# Patient Record
Sex: FEMALE | Race: WHITE
[De-identification: names, ages, dates, MRNs, and addresses within clinical notes are randomized per-mention and may not be internally consistent; named-entity substitution may affect disease eponyms.]

---

## 2020-05-06 ENCOUNTER — HOSPITAL ENCOUNTER (EMERGENCY)
Age: 60
Discharge: HOME | End: 2020-05-06
Payer: MEDICAID

## 2020-05-06 DIAGNOSIS — M54.42: Primary | ICD-10-CM

## 2020-05-07 ENCOUNTER — HOSPITAL ENCOUNTER (EMERGENCY)
Age: 60
Discharge: HOME | End: 2020-05-07
Payer: MEDICAID

## 2020-05-07 DIAGNOSIS — M54.5: Primary | ICD-10-CM

## 2020-05-07 DIAGNOSIS — M79.604: ICD-10-CM

## 2020-05-11 ENCOUNTER — HOSPITAL ENCOUNTER (EMERGENCY)
Age: 60
Discharge: HOME | End: 2020-05-11
Payer: MEDICAID

## 2020-05-11 ENCOUNTER — HOSPITAL ENCOUNTER (EMERGENCY)
Age: 60
LOS: 1 days | Discharge: HOME | End: 2020-05-12
Payer: MEDICAID

## 2020-05-11 DIAGNOSIS — M54.42: Primary | ICD-10-CM

## 2020-05-11 DIAGNOSIS — F41.9: ICD-10-CM

## 2020-05-11 DIAGNOSIS — M79.605: ICD-10-CM

## 2020-05-11 DIAGNOSIS — M54.5: Primary | ICD-10-CM

## 2020-07-13 ENCOUNTER — HOSPITAL ENCOUNTER (EMERGENCY)
Dept: HOSPITAL 82 - ED | Age: 60
Discharge: HOME | End: 2020-07-13
Payer: MEDICAID

## 2020-07-13 VITALS — SYSTOLIC BLOOD PRESSURE: 119 MMHG | DIASTOLIC BLOOD PRESSURE: 74 MMHG

## 2020-07-13 VITALS — HEIGHT: 66 IN | BODY MASS INDEX: 24.16 KG/M2 | WEIGHT: 150.31 LBS

## 2020-07-13 DIAGNOSIS — J44.9: ICD-10-CM

## 2020-07-13 DIAGNOSIS — M25.511: Primary | ICD-10-CM

## 2020-07-13 DIAGNOSIS — M54.2: ICD-10-CM

## 2020-07-13 DIAGNOSIS — G89.29: ICD-10-CM

## 2020-09-06 ENCOUNTER — HOSPITAL ENCOUNTER (INPATIENT)
Dept: HOSPITAL 82 - ED | Age: 60
LOS: 2 days | Discharge: HOME | DRG: 392 | End: 2020-09-08
Attending: INTERNAL MEDICINE | Admitting: INTERNAL MEDICINE
Payer: MEDICAID

## 2020-09-06 VITALS — DIASTOLIC BLOOD PRESSURE: 68 MMHG | SYSTOLIC BLOOD PRESSURE: 97 MMHG

## 2020-09-06 VITALS — WEIGHT: 147.27 LBS | HEIGHT: 66 IN | BODY MASS INDEX: 23.67 KG/M2

## 2020-09-06 DIAGNOSIS — K57.32: Primary | ICD-10-CM

## 2020-09-06 DIAGNOSIS — Z20.828: ICD-10-CM

## 2020-09-06 DIAGNOSIS — J44.9: ICD-10-CM

## 2020-09-06 LAB
ALBUMIN SERPL-MCNC: 4.3 G/DL (ref 3.2–5)
ALP SERPL-CCNC: 113 U/L (ref 38–126)
AMYLASE SERPL-CCNC: 41 U/L (ref 30–110)
ANION GAP SERPL CALCULATED.3IONS-SCNC: 13 MMOL/L
AST SERPL-CCNC: 15 U/L (ref 14–36)
BASOPHILS NFR BLD AUTO: 1 % (ref 0–3)
BILIRUB UR QL STRIP.AUTO: NEGATIVE
BUN SERPL-MCNC: 11 MG/DL (ref 7–17)
BUN/CREAT SERPL: 14
CHLORIDE SERPL-SCNC: 101 MMOL/L (ref 95–108)
CO2 SERPL-SCNC: 26 MMOL/L (ref 22–30)
COLOR UR AUTO: YELLOW
CREAT SERPL-MCNC: 0.8 MG/DL (ref 0.5–1)
EOSINOPHIL NFR BLD AUTO: 1 % (ref 0–8)
EPI CELLS URNS QL MICRO: (no result) EPI/HPF
ERYTHROCYTE [DISTWIDTH] IN BLOOD BY AUTOMATED COUNT: 13.3 % (ref 11.5–15.5)
GLUCOSE UR STRIP.AUTO-MCNC: NEGATIVE MG/DL
HCT VFR BLD AUTO: 37.7 % (ref 37–47)
HGB BLD-MCNC: 11.9 G/DL (ref 12–16)
HGB UR QL STRIP.AUTO: (no result)
IMM GRANULOCYTES NFR BLD: 0.4 % (ref 0–5)
KETONES UR STRIP.AUTO-MCNC: NEGATIVE MG/DL
LEUKOCYTE ESTERASE UR QL STRIP.AUTO: NEGATIVE
LIPASE SERPL-CCNC: 67 U/L (ref 23–300)
LYMPHOCYTES NFR BLD: 16 % (ref 15–41)
MCH RBC QN AUTO: 32.2 PG  CALC (ref 26–32)
MCHC RBC AUTO-ENTMCNC: 31.6 G/DL CAL (ref 32–36)
MCV RBC AUTO: 101.9 FL  CALC (ref 80–100)
MONOCYTES NFR BLD AUTO: 9 % (ref 2–13)
NEUTROPHILS # BLD AUTO: 14.2 THOU/UL (ref 2–7.15)
NEUTROPHILS NFR BLD AUTO: 73 % (ref 42–76)
NITRITE UR QL STRIP.AUTO: NEGATIVE
PH UR STRIP.AUTO: 6.5 [PH] (ref 4.5–8)
PLATELET # BLD AUTO: 357 THOU/UL (ref 130–400)
POTASSIUM SERPL-SCNC: 4.1 MMOL/L (ref 3.5–5.1)
PROT SERPL-MCNC: 7 G/DL (ref 6.3–8.2)
PROT UR QL STRIP.AUTO: NEGATIVE MG/DL
RBC # BLD AUTO: 3.7 MILL/UL (ref 4.2–5.6)
RBC #/AREA URNS HPF: (no result) RBC/HPF (ref 0–5)
SODIUM SERPL-SCNC: 136 MMOL/L (ref 137–146)
SP GR UR STRIP.AUTO: 1.01
UROBILINOGEN UR QL STRIP.AUTO: 0.2 E.U./DL

## 2020-09-06 NOTE — NUR
REPORT FROM DOMINGO LOAIZA. PT NOTED SITTING UP IN BED. NO APPARENT DISTRESS NOTED. PT
C/O ABD PAIN AND IS REQUESTING FOOD. ALERT AND ORIENTED. DISCUSSED POC. PT
VERBALIZED UNDERSTANDING. NO CURRENT PAIN MEDICATION ORDERS OR DIET ORDERED
WITH CONTACT ON CALL PHYSICIAN FOR ORDERS AT THIS TIME. IV SITE APPEARS
HEALTHY. CALL LIGHT WITHIN REACH. WILL CONTINUE TO MONITOR.

## 2020-09-06 NOTE — NUR
PT RESTING IN BED WITH EYES CLOSED, SNORING. NO APPARENT DISTRESS NOTED.
RESPIRATIONS EVEN AND UNLABORED. IV FLUIDS INFUSING WITHOUT DIFFICULTY. CALL
LIGHT WITHIN REACH. WILL CONTINUE TO MONITOR.

## 2020-09-06 NOTE — NUR
The patient is waiting to go to CT. She sts that the morphine helped her
headache but she still has cramps that are intermittent.

## 2020-09-06 NOTE — NUR
Admission Note
 
Report Given to:         
Transported by:          X Wheelchair           Stretcher
 
Transported with:        X Nurse     Transporter   X Patent IV    O2
                          Cardiac Monitor
 
Location:                 ICU      X [facility]
 
 
          
 
         PT TRANSPORTED IN STABLE CONDITION TO [facility] # [id number]. DOMINGO RN BEDSIDE
UPON ARRIVAL.

## 2020-09-07 VITALS — SYSTOLIC BLOOD PRESSURE: 110 MMHG | DIASTOLIC BLOOD PRESSURE: 64 MMHG

## 2020-09-07 VITALS — DIASTOLIC BLOOD PRESSURE: 54 MMHG | SYSTOLIC BLOOD PRESSURE: 103 MMHG

## 2020-09-07 VITALS — SYSTOLIC BLOOD PRESSURE: 91 MMHG | DIASTOLIC BLOOD PRESSURE: 60 MMHG

## 2020-09-07 LAB
ANION GAP SERPL CALCULATED.3IONS-SCNC: 9 MMOL/L
BASOPHILS NFR BLD AUTO: 1 % (ref 0–3)
BUN SERPL-MCNC: 8 MG/DL (ref 7–17)
BUN/CREAT SERPL: 12
CHLORIDE SERPL-SCNC: 104 MMOL/L (ref 95–108)
CO2 SERPL-SCNC: 26 MMOL/L (ref 22–30)
CREAT SERPL-MCNC: 0.7 MG/DL (ref 0.5–1)
EOSINOPHIL NFR BLD AUTO: 2 % (ref 0–8)
ERYTHROCYTE [DISTWIDTH] IN BLOOD BY AUTOMATED COUNT: 13.4 % (ref 11.5–15.5)
HCT VFR BLD AUTO: 33.7 % (ref 37–47)
HGB BLD-MCNC: 10.3 G/DL (ref 12–16)
IMM GRANULOCYTES NFR BLD: 0.6 % (ref 0–5)
LYMPHOCYTES NFR BLD: 19 % (ref 15–41)
MCH RBC QN AUTO: 31.8 PG  CALC (ref 26–32)
MCHC RBC AUTO-ENTMCNC: 30.6 G/DL CAL (ref 32–36)
MCV RBC AUTO: 104 FL  CALC (ref 80–100)
MONOCYTES NFR BLD AUTO: 8 % (ref 2–13)
NEUTROPHILS # BLD AUTO: 10.4 THOU/UL (ref 2–7.15)
NEUTROPHILS NFR BLD AUTO: 70 % (ref 42–76)
PLATELET # BLD AUTO: 309 THOU/UL (ref 130–400)
POTASSIUM SERPL-SCNC: 3.9 MMOL/L (ref 3.5–5.1)
RBC # BLD AUTO: 3.24 MILL/UL (ref 4.2–5.6)
SODIUM SERPL-SCNC: 134 MMOL/L (ref 137–146)

## 2020-09-07 NOTE — NUR
REPORT FROM RN. PT NOTED SITTING UP IN BED. NO APPARENT DISTRESS NOTED.
PT C/O ABD PAIN AND IS REQUESTING MEDICATION.  ALERT AND ORIENTED. DISCUSSED
POC. PT VERBALIZED UNDERSTANDING. WILL GIVE PRN MEDICATION.  IV SITE APPEARS
HEALTHY. CALL LIGHT WITHIN REACH. WILL CONTINUE TO MONITOR.

## 2020-09-07 NOTE — NUR
PT MEDICATED FOR ABD PAIN 7/10 WITH IV MORPHINE. NO APPARENT DISTRESS NOTED.
PT DENIES ANY OTHER WANTS OR NEEDS AT THIS TIME. CALL LIGHT WITHIN REACH. WILL
CONTINUE TO MONITOR.

## 2020-09-07 NOTE — NUR
Patient medicated twice with Morphine as scheduled. and stated it din't really
help. Writer in room while talking with resident and she fell asleep. Dr in
and new orders written to start Loratab 1 given at 12 noon. Recheck with
shima on pain level and she stated that the Loratab didn't help much.

## 2020-09-08 VITALS — DIASTOLIC BLOOD PRESSURE: 69 MMHG | SYSTOLIC BLOOD PRESSURE: 105 MMHG

## 2020-09-08 VITALS — DIASTOLIC BLOOD PRESSURE: 55 MMHG | SYSTOLIC BLOOD PRESSURE: 94 MMHG

## 2020-09-08 LAB
ALBUMIN SERPL-MCNC: 3.2 G/DL (ref 3.2–5)
ALP SERPL-CCNC: 80 U/L (ref 38–126)
ANION GAP SERPL CALCULATED.3IONS-SCNC: 8 MMOL/L
AST SERPL-CCNC: 13 U/L (ref 14–36)
BASOPHILS NFR BLD AUTO: 1 % (ref 0–3)
BUN SERPL-MCNC: 4 MG/DL (ref 7–17)
BUN/CREAT SERPL: 7
CHLORIDE SERPL-SCNC: 106 MMOL/L (ref 95–108)
CO2 SERPL-SCNC: 28 MMOL/L (ref 22–30)
CREAT SERPL-MCNC: 0.5 MG/DL (ref 0.5–1)
EOSINOPHIL NFR BLD AUTO: 3 % (ref 0–8)
ERYTHROCYTE [DISTWIDTH] IN BLOOD BY AUTOMATED COUNT: 13.2 % (ref 11.5–15.5)
HCT VFR BLD AUTO: 31.7 % (ref 37–47)
HGB BLD-MCNC: 9.7 G/DL (ref 12–16)
IMM GRANULOCYTES NFR BLD: 0.4 % (ref 0–5)
LYMPHOCYTES NFR BLD: 21 % (ref 15–41)
MCH RBC QN AUTO: 32.2 PG  CALC (ref 26–32)
MCHC RBC AUTO-ENTMCNC: 30.6 G/DL CAL (ref 32–36)
MCV RBC AUTO: 105.3 FL  CALC (ref 80–100)
MONOCYTES NFR BLD AUTO: 8 % (ref 2–13)
NEUTROPHILS # BLD AUTO: 7.22 THOU/UL (ref 2–7.15)
NEUTROPHILS NFR BLD AUTO: 67 % (ref 42–76)
PLATELET # BLD AUTO: 313 THOU/UL (ref 130–400)
POTASSIUM SERPL-SCNC: 4.2 MMOL/L (ref 3.5–5.1)
PROT SERPL-MCNC: 5.6 G/DL (ref 6.3–8.2)
RBC # BLD AUTO: 3.01 MILL/UL (ref 4.2–5.6)
SODIUM SERPL-SCNC: 138 MMOL/L (ref 137–146)

## 2020-09-08 NOTE — NUR
Discharge instructions given. Patient verbalizes understanding of same.
Discharged in stable condition via Ambulatory to Home with
spouse. All belongings sent with pt.

## 2020-09-08 NOTE — NUR
REPORT RECEIVED FROM JOSSE ANDRES. PT SITTING UP IN HIGH FOWLERS EATING CLEAR
LIQUID BREAKFAST; ALERT AND ORIENTED. DENIES PAIN WHILE RESTING, BUT REPORTS
PAIN IF SHE APPLIES PRESSURE TO LEFT UPPER QUADRANT OF ABDOMEN. REPORTS THAT
SHE IS TOLERATING HER CLEAR LIQUID DIET WELL WITH ONLY MILD PAIN AFTER EATING;
DENIES ANY NAUSEA. RESPIRATIONS EVEN AND UNLABORED ON ROOM AIR. PLAN OF CARE
REVIEWED. PT ENCOURAGED TO VERBALIZE CONCERNS. STATES UNDERSTANDING. SAFETY
MEASURES IN PLACE. CALL LIGHT WITHIN REACH.

## 2020-09-08 NOTE — NUR
PT IS RESTING IN BED WITH NO S/S OF DISTRESS NOTED.  IV PATENT WITH NO
REDDNESS OR SWELLING.  PT DENIES ANY NEEDS AT THIS TIME. CALL LIGHT IN REACH.

## 2020-09-08 NOTE — NUR
IV site discontinued, cath intact.  No edema , no redness, voices no
discomfort. Cipro completed prior to dc.

## 2020-10-04 ENCOUNTER — HOSPITAL ENCOUNTER (EMERGENCY)
Dept: HOSPITAL 82 - ED | Age: 60
Discharge: HOME | End: 2020-10-04
Payer: MEDICAID

## 2020-10-04 VITALS — BODY MASS INDEX: 24.8 KG/M2 | HEIGHT: 66 IN | WEIGHT: 154.32 LBS

## 2020-10-04 VITALS — DIASTOLIC BLOOD PRESSURE: 65 MMHG | SYSTOLIC BLOOD PRESSURE: 142 MMHG

## 2020-10-04 DIAGNOSIS — Z20.828: ICD-10-CM

## 2020-10-04 DIAGNOSIS — J44.1: Primary | ICD-10-CM

## 2020-10-04 DIAGNOSIS — F17.210: ICD-10-CM

## 2020-10-04 LAB
ALBUMIN SERPL-MCNC: 4.4 G/DL (ref 3.2–5)
ALP SERPL-CCNC: 83 U/L (ref 38–126)
ANION GAP SERPL CALCULATED.3IONS-SCNC: 11 MMOL/L
AST SERPL-CCNC: 29 U/L (ref 14–36)
BASOPHILS NFR BLD AUTO: 1 % (ref 0–3)
BUN SERPL-MCNC: 15 MG/DL (ref 7–17)
BUN/CREAT SERPL: 20
CHLORIDE SERPL-SCNC: 105 MMOL/L (ref 95–108)
CO2 SERPL-SCNC: 27 MMOL/L (ref 22–30)
CREAT SERPL-MCNC: 0.7 MG/DL (ref 0.5–1)
EOSINOPHIL NFR BLD AUTO: 4 % (ref 0–8)
ERYTHROCYTE [DISTWIDTH] IN BLOOD BY AUTOMATED COUNT: 13.9 % (ref 11.5–15.5)
HCT VFR BLD AUTO: 35.5 % (ref 37–47)
HGB BLD-MCNC: 11.5 G/DL (ref 12–16)
IMM GRANULOCYTES NFR BLD: 0.3 % (ref 0–5)
LYMPHOCYTES NFR BLD: 33 % (ref 15–41)
MCH RBC QN AUTO: 32.6 PG  CALC (ref 26–32)
MCHC RBC AUTO-ENTMCNC: 32.4 G/DL CAL (ref 32–36)
MCV RBC AUTO: 100.6 FL  CALC (ref 80–100)
MONOCYTES NFR BLD AUTO: 8 % (ref 2–13)
NEUTROPHILS # BLD AUTO: 5.22 THOU/UL (ref 2–7.15)
NEUTROPHILS NFR BLD AUTO: 54 % (ref 42–76)
PLATELET # BLD AUTO: 279 THOU/UL (ref 130–400)
POTASSIUM SERPL-SCNC: 4.9 MMOL/L (ref 3.5–5.1)
PROT SERPL-MCNC: 7.3 G/DL (ref 6.3–8.2)
RBC # BLD AUTO: 3.53 MILL/UL (ref 4.2–5.6)
SODIUM SERPL-SCNC: 138 MMOL/L (ref 137–146)

## 2021-06-05 ENCOUNTER — HOSPITAL ENCOUNTER (EMERGENCY)
Dept: HOSPITAL 82 - ED | Age: 61
Discharge: HOME | End: 2021-06-05
Payer: MEDICAID

## 2021-06-05 VITALS — DIASTOLIC BLOOD PRESSURE: 71 MMHG | SYSTOLIC BLOOD PRESSURE: 120 MMHG

## 2021-06-05 VITALS — WEIGHT: 146.17 LBS | BODY MASS INDEX: 23.49 KG/M2 | HEIGHT: 66 IN

## 2021-06-05 DIAGNOSIS — J44.9: ICD-10-CM

## 2021-06-05 DIAGNOSIS — M54.2: Primary | ICD-10-CM

## 2021-06-05 DIAGNOSIS — M54.5: ICD-10-CM

## 2021-06-05 DIAGNOSIS — G89.29: ICD-10-CM

## 2021-08-28 ENCOUNTER — HOSPITAL ENCOUNTER (EMERGENCY)
Dept: HOSPITAL 82 - ED | Age: 61
Discharge: HOME | End: 2021-08-28
Payer: MEDICAID

## 2021-08-28 VITALS — WEIGHT: 141.1 LBS | HEIGHT: 66 IN | BODY MASS INDEX: 22.68 KG/M2

## 2021-08-28 VITALS — DIASTOLIC BLOOD PRESSURE: 65 MMHG | SYSTOLIC BLOOD PRESSURE: 126 MMHG

## 2021-08-28 DIAGNOSIS — M25.531: Primary | ICD-10-CM

## 2021-08-28 DIAGNOSIS — Z20.822: ICD-10-CM

## 2021-08-28 DIAGNOSIS — J44.9: ICD-10-CM

## 2021-08-28 DIAGNOSIS — B34.9: ICD-10-CM

## 2022-08-10 ENCOUNTER — HOSPITAL ENCOUNTER (EMERGENCY)
Dept: HOSPITAL 82 - ED | Age: 62
Discharge: HOME | End: 2022-08-10
Payer: MEDICAID

## 2022-08-10 VITALS — WEIGHT: 146.17 LBS | BODY MASS INDEX: 23.49 KG/M2 | HEIGHT: 66 IN

## 2022-08-10 VITALS — DIASTOLIC BLOOD PRESSURE: 63 MMHG | SYSTOLIC BLOOD PRESSURE: 105 MMHG

## 2022-08-10 DIAGNOSIS — I10: ICD-10-CM

## 2022-08-10 DIAGNOSIS — L60.0: Primary | ICD-10-CM

## 2022-08-10 DIAGNOSIS — E11.9: ICD-10-CM

## 2022-08-10 DIAGNOSIS — F17.210: ICD-10-CM

## 2022-08-10 DIAGNOSIS — J44.9: ICD-10-CM

## 2022-09-06 ENCOUNTER — HOSPITAL ENCOUNTER (EMERGENCY)
Dept: HOSPITAL 82 - ED | Age: 62
Discharge: HOME | End: 2022-09-06
Payer: MEDICAID

## 2022-09-06 VITALS — DIASTOLIC BLOOD PRESSURE: 77 MMHG | SYSTOLIC BLOOD PRESSURE: 153 MMHG

## 2022-09-06 VITALS — DIASTOLIC BLOOD PRESSURE: 62 MMHG | SYSTOLIC BLOOD PRESSURE: 139 MMHG

## 2022-09-06 VITALS — DIASTOLIC BLOOD PRESSURE: 74 MMHG | SYSTOLIC BLOOD PRESSURE: 136 MMHG

## 2022-09-06 VITALS — DIASTOLIC BLOOD PRESSURE: 77 MMHG | SYSTOLIC BLOOD PRESSURE: 161 MMHG

## 2022-09-06 VITALS — SYSTOLIC BLOOD PRESSURE: 149 MMHG | DIASTOLIC BLOOD PRESSURE: 78 MMHG

## 2022-09-06 VITALS — BODY MASS INDEX: 24.09 KG/M2 | HEIGHT: 66 IN | WEIGHT: 149.91 LBS

## 2022-09-06 VITALS — SYSTOLIC BLOOD PRESSURE: 144 MMHG | DIASTOLIC BLOOD PRESSURE: 70 MMHG

## 2022-09-06 VITALS — DIASTOLIC BLOOD PRESSURE: 70 MMHG | SYSTOLIC BLOOD PRESSURE: 152 MMHG

## 2022-09-06 VITALS — DIASTOLIC BLOOD PRESSURE: 80 MMHG | SYSTOLIC BLOOD PRESSURE: 155 MMHG

## 2022-09-06 VITALS — SYSTOLIC BLOOD PRESSURE: 144 MMHG | DIASTOLIC BLOOD PRESSURE: 76 MMHG

## 2022-09-06 DIAGNOSIS — E11.9: ICD-10-CM

## 2022-09-06 DIAGNOSIS — R60.0: Primary | ICD-10-CM

## 2022-09-06 DIAGNOSIS — R53.1: ICD-10-CM

## 2022-09-06 DIAGNOSIS — Z20.822: ICD-10-CM

## 2022-09-06 DIAGNOSIS — I10: ICD-10-CM

## 2022-09-06 LAB
ALBUMIN SERPL-MCNC: 4.6 G/DL (ref 3.2–5)
ALP SERPL-CCNC: 94 U/L (ref 38–126)
ANION GAP SERPL CALCULATED.3IONS-SCNC: 13 MMOL/L
AST SERPL-CCNC: 20 U/L (ref 9–36)
BASOPHILS NFR BLD AUTO: 1 % (ref 0–3)
BILIRUB UR QL STRIP.AUTO: NEGATIVE
BUN SERPL-MCNC: 8 MG/DL (ref 8–23)
BUN/CREAT SERPL: 12
CHLORIDE SERPL-SCNC: 100 MMOL/L (ref 95–108)
CO2 SERPL-SCNC: 30 MMOL/L (ref 22–30)
COLOR UR AUTO: YELLOW
CREAT SERPL-MCNC: 0.7 MG/DL (ref 0.5–1)
EOSINOPHIL NFR BLD AUTO: 4 % (ref 0–8)
ERYTHROCYTE [DISTWIDTH] IN BLOOD BY AUTOMATED COUNT: 13.3 % (ref 11.5–15.5)
GLUCOSE UR STRIP.AUTO-MCNC: NEGATIVE MG/DL
HCT VFR BLD AUTO: 34.6 % (ref 37–47)
HGB BLD-MCNC: 11.4 G/DL (ref 12–16)
HGB UR QL STRIP.AUTO: NEGATIVE
IMM GRANULOCYTES NFR BLD: 0.1 % (ref 0–5)
INR PPP: 0.9 RATIO (ref 0.7–1.3)
KETONES UR STRIP.AUTO-MCNC: NEGATIVE MG/DL
LEUKOCYTE ESTERASE UR QL STRIP.AUTO: NEGATIVE
LYMPHOCYTES NFR BLD: 43 % (ref 15–41)
MCH RBC QN AUTO: 33.3 PG  CALC (ref 26–32)
MCHC RBC AUTO-ENTMCNC: 32.9 G/DL CAL (ref 32–36)
MCV RBC AUTO: 101.2 FL  CALC (ref 80–100)
MONOCYTES NFR BLD AUTO: 8 % (ref 2–13)
MYOGLOBIN SERPL-MCNC: 23 NG/ML (ref 0–62)
NEUTROPHILS # BLD AUTO: 4.37 THOU/UL (ref 2–7.15)
NEUTROPHILS NFR BLD AUTO: 44 % (ref 42–76)
NITRITE UR QL STRIP.AUTO: NEGATIVE
PH UR STRIP.AUTO: 8 [PH] (ref 4.5–8)
PLATELET # BLD AUTO: 422 THOU/UL (ref 130–400)
POTASSIUM SERPL-SCNC: 3.7 MMOL/L (ref 3.5–5.1)
PROT SERPL-MCNC: 7.7 G/DL (ref 6.3–8.2)
PROT UR QL STRIP.AUTO: NEGATIVE MG/DL
PROTHROMBIN TIME: 9.1 SECONDS (ref 9–12.5)
RBC # BLD AUTO: 3.42 MILL/UL (ref 4.2–5.6)
SODIUM SERPL-SCNC: 139 MMOL/L (ref 137–146)
SP GR UR STRIP.AUTO: 1.01
UROBILINOGEN UR QL STRIP.AUTO: 0.2 E.U./DL

## 2022-12-14 ENCOUNTER — HOSPITAL ENCOUNTER (EMERGENCY)
Dept: HOSPITAL 82 - ED | Age: 62
Discharge: HOME | End: 2022-12-14
Payer: MEDICAID

## 2022-12-14 VITALS — DIASTOLIC BLOOD PRESSURE: 75 MMHG | SYSTOLIC BLOOD PRESSURE: 121 MMHG

## 2022-12-14 VITALS — DIASTOLIC BLOOD PRESSURE: 74 MMHG | SYSTOLIC BLOOD PRESSURE: 127 MMHG

## 2022-12-14 VITALS — DIASTOLIC BLOOD PRESSURE: 70 MMHG | SYSTOLIC BLOOD PRESSURE: 127 MMHG

## 2022-12-14 VITALS — DIASTOLIC BLOOD PRESSURE: 73 MMHG | SYSTOLIC BLOOD PRESSURE: 121 MMHG

## 2022-12-14 VITALS — SYSTOLIC BLOOD PRESSURE: 138 MMHG | DIASTOLIC BLOOD PRESSURE: 77 MMHG

## 2022-12-14 VITALS — SYSTOLIC BLOOD PRESSURE: 123 MMHG | DIASTOLIC BLOOD PRESSURE: 69 MMHG

## 2022-12-14 VITALS — SYSTOLIC BLOOD PRESSURE: 115 MMHG | DIASTOLIC BLOOD PRESSURE: 72 MMHG

## 2022-12-14 VITALS — SYSTOLIC BLOOD PRESSURE: 129 MMHG | DIASTOLIC BLOOD PRESSURE: 76 MMHG

## 2022-12-14 VITALS — SYSTOLIC BLOOD PRESSURE: 140 MMHG | DIASTOLIC BLOOD PRESSURE: 77 MMHG

## 2022-12-14 VITALS — SYSTOLIC BLOOD PRESSURE: 107 MMHG | DIASTOLIC BLOOD PRESSURE: 69 MMHG

## 2022-12-14 VITALS — BODY MASS INDEX: 22.53 KG/M2 | HEIGHT: 66 IN | WEIGHT: 140.21 LBS

## 2022-12-14 VITALS — SYSTOLIC BLOOD PRESSURE: 121 MMHG | DIASTOLIC BLOOD PRESSURE: 70 MMHG

## 2022-12-14 VITALS — DIASTOLIC BLOOD PRESSURE: 70 MMHG | SYSTOLIC BLOOD PRESSURE: 118 MMHG

## 2022-12-14 VITALS — DIASTOLIC BLOOD PRESSURE: 78 MMHG | SYSTOLIC BLOOD PRESSURE: 131 MMHG

## 2022-12-14 VITALS — DIASTOLIC BLOOD PRESSURE: 63 MMHG | SYSTOLIC BLOOD PRESSURE: 119 MMHG

## 2022-12-14 DIAGNOSIS — J44.1: Primary | ICD-10-CM

## 2022-12-14 DIAGNOSIS — E11.9: ICD-10-CM

## 2022-12-14 DIAGNOSIS — M79.7: ICD-10-CM

## 2022-12-14 DIAGNOSIS — I10: ICD-10-CM

## 2022-12-14 DIAGNOSIS — E78.5: ICD-10-CM

## 2022-12-14 DIAGNOSIS — F17.200: ICD-10-CM

## 2022-12-14 DIAGNOSIS — Z20.822: ICD-10-CM

## 2022-12-14 LAB
ALBUMIN SERPL-MCNC: 4.3 G/DL (ref 3.2–5)
ALP SERPL-CCNC: 104 U/L (ref 38–126)
ANION GAP SERPL CALCULATED.3IONS-SCNC: 13 MMOL/L
AST SERPL-CCNC: 32 U/L (ref 9–36)
BASOPHILS NFR BLD AUTO: 1 % (ref 0–3)
BUN SERPL-MCNC: 11 MG/DL (ref 8–23)
BUN/CREAT SERPL: 15
CHLORIDE SERPL-SCNC: 94 MMOL/L (ref 95–108)
CO2 SERPL-SCNC: 30 MMOL/L (ref 22–30)
CREAT SERPL-MCNC: 0.7 MG/DL (ref 0.5–1)
D DIMER PPP FEU-MCNC: 0.3 MG/L (ref 0.19–0.6)
EOSINOPHIL NFR BLD AUTO: 3 % (ref 0–8)
ERYTHROCYTE [DISTWIDTH] IN BLOOD BY AUTOMATED COUNT: 13 % (ref 11.5–15.5)
HCT VFR BLD AUTO: 34.4 % (ref 37–47)
HGB BLD-MCNC: 11.2 G/DL (ref 12–16)
IMM GRANULOCYTES NFR BLD: 0.2 % (ref 0–5)
INR PPP: 1 RATIO (ref 0.7–1.3)
LYMPHOCYTES NFR BLD: 35 % (ref 15–41)
MCH RBC QN AUTO: 32.9 PG  CALC (ref 26–32)
MCHC RBC AUTO-ENTMCNC: 32.6 G/DL CAL (ref 32–36)
MCV RBC AUTO: 101.2 FL  CALC (ref 80–100)
MONOCYTES NFR BLD AUTO: 8 % (ref 2–13)
NEUTROPHILS # BLD AUTO: 2.75 THOU/UL (ref 2–7.15)
NEUTROPHILS NFR BLD AUTO: 53 % (ref 42–76)
PLATELET # BLD AUTO: 347 THOU/UL (ref 130–400)
POTASSIUM SERPL-SCNC: 3.9 MMOL/L (ref 3.5–5.1)
PROT SERPL-MCNC: 7.4 G/DL (ref 6.3–8.2)
PROTHROMBIN TIME: 9.7 SECONDS (ref 9–12.5)
RBC # BLD AUTO: 3.4 MILL/UL (ref 4.2–5.6)
SODIUM SERPL-SCNC: 133 MMOL/L (ref 137–146)

## 2022-12-16 ENCOUNTER — HOSPITAL ENCOUNTER (OUTPATIENT)
Dept: HOSPITAL 82 - ED | Age: 62
Setting detail: OBSERVATION
LOS: 1 days | Discharge: LEFT BEFORE DISCHARGE | End: 2022-12-17
Attending: INTERNAL MEDICINE | Admitting: INTERNAL MEDICINE
Payer: MEDICAID

## 2022-12-16 VITALS — SYSTOLIC BLOOD PRESSURE: 105 MMHG | DIASTOLIC BLOOD PRESSURE: 57 MMHG

## 2022-12-16 VITALS — DIASTOLIC BLOOD PRESSURE: 69 MMHG | SYSTOLIC BLOOD PRESSURE: 135 MMHG

## 2022-12-16 VITALS — SYSTOLIC BLOOD PRESSURE: 132 MMHG | DIASTOLIC BLOOD PRESSURE: 67 MMHG

## 2022-12-16 VITALS — SYSTOLIC BLOOD PRESSURE: 129 MMHG | DIASTOLIC BLOOD PRESSURE: 85 MMHG

## 2022-12-16 VITALS — HEIGHT: 66 IN | WEIGHT: 141.98 LBS | BODY MASS INDEX: 22.82 KG/M2

## 2022-12-16 VITALS — SYSTOLIC BLOOD PRESSURE: 116 MMHG | DIASTOLIC BLOOD PRESSURE: 63 MMHG

## 2022-12-16 VITALS — DIASTOLIC BLOOD PRESSURE: 60 MMHG | SYSTOLIC BLOOD PRESSURE: 111 MMHG

## 2022-12-16 VITALS — SYSTOLIC BLOOD PRESSURE: 107 MMHG | DIASTOLIC BLOOD PRESSURE: 48 MMHG

## 2022-12-16 VITALS — SYSTOLIC BLOOD PRESSURE: 123 MMHG | DIASTOLIC BLOOD PRESSURE: 55 MMHG

## 2022-12-16 VITALS — SYSTOLIC BLOOD PRESSURE: 117 MMHG | DIASTOLIC BLOOD PRESSURE: 66 MMHG

## 2022-12-16 VITALS — DIASTOLIC BLOOD PRESSURE: 67 MMHG | SYSTOLIC BLOOD PRESSURE: 122 MMHG

## 2022-12-16 VITALS — DIASTOLIC BLOOD PRESSURE: 59 MMHG | SYSTOLIC BLOOD PRESSURE: 118 MMHG

## 2022-12-16 VITALS — DIASTOLIC BLOOD PRESSURE: 55 MMHG | SYSTOLIC BLOOD PRESSURE: 109 MMHG

## 2022-12-16 DIAGNOSIS — J44.1: Primary | ICD-10-CM

## 2022-12-16 DIAGNOSIS — M19.90: ICD-10-CM

## 2022-12-16 DIAGNOSIS — F17.210: ICD-10-CM

## 2022-12-16 DIAGNOSIS — M79.7: ICD-10-CM

## 2022-12-16 DIAGNOSIS — E11.9: ICD-10-CM

## 2022-12-16 DIAGNOSIS — Z20.822: ICD-10-CM

## 2022-12-16 DIAGNOSIS — J96.01: ICD-10-CM

## 2022-12-16 DIAGNOSIS — I10: ICD-10-CM

## 2022-12-16 LAB
ALBUMIN SERPL-MCNC: 4.5 G/DL (ref 3.2–5)
ALP SERPL-CCNC: 105 U/L (ref 38–126)
ANION GAP SERPL CALCULATED.3IONS-SCNC: 12 MMOL/L
AST SERPL-CCNC: 25 U/L (ref 9–36)
BASOPHILS NFR BLD AUTO: 0 % (ref 0–3)
BUN SERPL-MCNC: 15 MG/DL (ref 8–23)
BUN/CREAT SERPL: 16
CHLORIDE SERPL-SCNC: 97 MMOL/L (ref 95–108)
CO2 SERPL-SCNC: 28 MMOL/L (ref 22–30)
CREAT SERPL-MCNC: 1 MG/DL (ref 0.5–1)
EOSINOPHIL NFR BLD AUTO: 0 % (ref 0–8)
ERYTHROCYTE [DISTWIDTH] IN BLOOD BY AUTOMATED COUNT: 13 % (ref 11.5–15.5)
HCT VFR BLD AUTO: 34 % (ref 37–47)
HGB BLD-MCNC: 11.3 G/DL (ref 12–16)
IMM GRANULOCYTES NFR BLD: 0.3 % (ref 0–5)
LYMPHOCYTES NFR BLD: 53 % (ref 15–41)
MCH RBC QN AUTO: 32.8 PG  CALC (ref 26–32)
MCHC RBC AUTO-ENTMCNC: 33.2 G/DL CAL (ref 32–36)
MCV RBC AUTO: 98.8 FL  CALC (ref 80–100)
MONOCYTES NFR BLD AUTO: 7 % (ref 2–13)
NEUTROPHILS # BLD AUTO: 2.75 THOU/UL (ref 2–7.15)
NEUTROPHILS NFR BLD AUTO: 40 % (ref 42–76)
PLATELET # BLD AUTO: 427 THOU/UL (ref 130–400)
POTASSIUM SERPL-SCNC: 4.5 MMOL/L (ref 3.5–5.1)
PROT SERPL-MCNC: 7.6 G/DL (ref 6.3–8.2)
RBC # BLD AUTO: 3.44 MILL/UL (ref 4.2–5.6)
SODIUM SERPL-SCNC: 133 MMOL/L (ref 137–146)

## 2022-12-16 PROCEDURE — G0378 HOSPITAL OBSERVATION PER HR: HCPCS

## 2022-12-16 NOTE — NUR
PATIENT BROUGHT TO TRIAGE ROOM AND PROVIDER NOTIFIED OF PATIENT STATUS.
RETURNED TO LOBBY TO AWAIT ROOM.

## 2022-12-16 NOTE — NUR
pt arrived via wc. A & Ox3. 2L NC tolorating well but does get winded when up
to bathrooom. lung sounds clear, ABD soft and non-tendered. skin intact. Med
rec has been reviewed and reconciled. Pt does not appear to be in no distress
at this time. oriented to room, pt education given to pt. Call lig in reach,
bed alarm on will continue to monitor.

## 2022-12-17 VITALS — SYSTOLIC BLOOD PRESSURE: 110 MMHG | DIASTOLIC BLOOD PRESSURE: 53 MMHG

## 2022-12-17 VITALS — SYSTOLIC BLOOD PRESSURE: 143 MMHG | DIASTOLIC BLOOD PRESSURE: 64 MMHG

## 2022-12-17 VITALS — SYSTOLIC BLOOD PRESSURE: 102 MMHG | DIASTOLIC BLOOD PRESSURE: 47 MMHG

## 2022-12-17 VITALS — SYSTOLIC BLOOD PRESSURE: 119 MMHG | DIASTOLIC BLOOD PRESSURE: 62 MMHG

## 2022-12-17 VITALS — DIASTOLIC BLOOD PRESSURE: 47 MMHG | SYSTOLIC BLOOD PRESSURE: 102 MMHG

## 2022-12-17 VITALS — DIASTOLIC BLOOD PRESSURE: 53 MMHG | SYSTOLIC BLOOD PRESSURE: 110 MMHG

## 2022-12-17 NOTE — NUR
RC'D REPORT FROM NIGHTSHIFT, PT IN BED COMPLAINS OF SOME ANXIETY, PT STATES
SHE WANTS TO GO HOME TODAY, PT ON 1 L NC AND WILL BE SLOWLY WEANED OFF, LUNGS
ARE CLEAR, UPDATED MED REC, PT AMBULATES AD GILEBRTO

## 2022-12-17 NOTE — NUR
Pt having a hard time falling asleep. currently receiving a breathing
treatment. Denies pain at this time. Call light in reach and bed alarm on.
Will continue to monitor.

## 2022-12-17 NOTE — NUR
PT DID 6 MIN WALK TEST AND FAILED. MD AWARE. OFFERED TO REPEAT WALK TEST, BUT
PT REFUSED AND WANTS TO LEAVE AMA

## 2022-12-20 ENCOUNTER — HOSPITAL ENCOUNTER (INPATIENT)
Dept: HOSPITAL 82 - ED | Age: 62
LOS: 3 days | Discharge: HOME | DRG: 190 | End: 2022-12-23
Attending: INTERNAL MEDICINE | Admitting: INTERNAL MEDICINE
Payer: MEDICAID

## 2022-12-20 VITALS — DIASTOLIC BLOOD PRESSURE: 71 MMHG | SYSTOLIC BLOOD PRESSURE: 142 MMHG

## 2022-12-20 VITALS — DIASTOLIC BLOOD PRESSURE: 87 MMHG | SYSTOLIC BLOOD PRESSURE: 164 MMHG

## 2022-12-20 VITALS — SYSTOLIC BLOOD PRESSURE: 136 MMHG | DIASTOLIC BLOOD PRESSURE: 72 MMHG

## 2022-12-20 VITALS — SYSTOLIC BLOOD PRESSURE: 139 MMHG | DIASTOLIC BLOOD PRESSURE: 80 MMHG

## 2022-12-20 VITALS — DIASTOLIC BLOOD PRESSURE: 94 MMHG | SYSTOLIC BLOOD PRESSURE: 154 MMHG

## 2022-12-20 VITALS — DIASTOLIC BLOOD PRESSURE: 65 MMHG | SYSTOLIC BLOOD PRESSURE: 152 MMHG

## 2022-12-20 VITALS — DIASTOLIC BLOOD PRESSURE: 91 MMHG | SYSTOLIC BLOOD PRESSURE: 159 MMHG

## 2022-12-20 VITALS — DIASTOLIC BLOOD PRESSURE: 71 MMHG | SYSTOLIC BLOOD PRESSURE: 141 MMHG

## 2022-12-20 VITALS — HEIGHT: 66 IN | WEIGHT: 149.91 LBS | BODY MASS INDEX: 24.09 KG/M2

## 2022-12-20 VITALS — SYSTOLIC BLOOD PRESSURE: 138 MMHG | DIASTOLIC BLOOD PRESSURE: 69 MMHG

## 2022-12-20 DIAGNOSIS — Z23: ICD-10-CM

## 2022-12-20 DIAGNOSIS — F41.9: ICD-10-CM

## 2022-12-20 DIAGNOSIS — Z20.822: ICD-10-CM

## 2022-12-20 DIAGNOSIS — J44.1: Primary | ICD-10-CM

## 2022-12-20 DIAGNOSIS — J96.01: ICD-10-CM

## 2022-12-20 DIAGNOSIS — G43.909: ICD-10-CM

## 2022-12-20 DIAGNOSIS — M79.7: ICD-10-CM

## 2022-12-20 DIAGNOSIS — E11.9: ICD-10-CM

## 2022-12-20 DIAGNOSIS — I10: ICD-10-CM

## 2022-12-20 DIAGNOSIS — F17.200: ICD-10-CM

## 2022-12-20 LAB
ALBUMIN SERPL-MCNC: 4.9 G/DL (ref 3.2–5)
ALP SERPL-CCNC: 120 U/L (ref 38–126)
ANION GAP SERPL CALCULATED.3IONS-SCNC: 18 MMOL/L
AST SERPL-CCNC: 31 U/L (ref 9–36)
BASOPHILS NFR BLD AUTO: 0.1 % (ref 0–3)
BILIRUB UR QL STRIP.AUTO: NEGATIVE
BUN SERPL-MCNC: 13 MG/DL (ref 8–23)
BUN/CREAT SERPL: 18
CHLORIDE SERPL-SCNC: 88 MMOL/L (ref 95–108)
CO2 SERPL-SCNC: 21 MMOL/L (ref 22–30)
COLOR UR AUTO: YELLOW
CREAT SERPL-MCNC: 0.7 MG/DL (ref 0.5–1)
EOSINOPHIL NFR BLD AUTO: 0.2 % (ref 0–8)
ERYTHROCYTE [DISTWIDTH] IN BLOOD BY AUTOMATED COUNT: 13.1 % (ref 11.5–15.5)
GLUCOSE UR STRIP.AUTO-MCNC: NEGATIVE MG/DL
HCT VFR BLD AUTO: 37.6 % (ref 37–47)
HGB BLD-MCNC: 13.1 G/DL (ref 12–16)
HGB UR QL STRIP.AUTO: NEGATIVE
IMM GRANULOCYTES NFR BLD: 2 % (ref 0–5)
KETONES UR STRIP.AUTO-MCNC: NEGATIVE MG/DL
LEUKOCYTE ESTERASE UR QL STRIP.AUTO: NEGATIVE
LYMPHOCYTES NFR BLD: 27 % (ref 15–41)
MCH RBC QN AUTO: 33.3 PG  CALC (ref 26–32)
MCHC RBC AUTO-ENTMCNC: 34.8 G/DL CAL (ref 32–36)
MCV RBC AUTO: 95.7 FL  CALC (ref 80–100)
MONOCYTES NFR BLD AUTO: 7.9 % (ref 2–13)
NEUTROPHILS # BLD AUTO: 7.35 THOU/UL (ref 2–7.15)
NEUTROPHILS NFR BLD AUTO: 62.8 % (ref 42–76)
NITRITE UR QL STRIP.AUTO: NEGATIVE
PH UR STRIP.AUTO: 6 [PH] (ref 4.5–8)
PLATELET # BLD AUTO: 596 THOU/UL (ref 130–400)
POTASSIUM SERPL-SCNC: 4.1 MMOL/L (ref 3.5–5.1)
PROT SERPL-MCNC: 8.2 G/DL (ref 6.3–8.2)
PROT UR QL STRIP.AUTO: NEGATIVE MG/DL
RBC # BLD AUTO: 3.93 MILL/UL (ref 4.2–5.6)
SODIUM SERPL-SCNC: 123 MMOL/L (ref 137–146)
SP GR UR STRIP.AUTO: 1.01
UROBILINOGEN UR QL STRIP.AUTO: 0.2 E.U./DL

## 2022-12-20 NOTE — NUR
PATIENT AGREES WITH PLAN FOR ADMISSION. WAITING ORDERS FOR VALIUM AS REQUESTED
BY PATIENT FOR ANXIETY.

## 2022-12-21 VITALS — DIASTOLIC BLOOD PRESSURE: 57 MMHG | SYSTOLIC BLOOD PRESSURE: 100 MMHG

## 2022-12-21 VITALS — DIASTOLIC BLOOD PRESSURE: 69 MMHG | SYSTOLIC BLOOD PRESSURE: 126 MMHG

## 2022-12-21 VITALS — DIASTOLIC BLOOD PRESSURE: 81 MMHG | SYSTOLIC BLOOD PRESSURE: 147 MMHG

## 2022-12-21 VITALS — DIASTOLIC BLOOD PRESSURE: 56 MMHG | SYSTOLIC BLOOD PRESSURE: 104 MMHG

## 2022-12-21 LAB
ALBUMIN SERPL-MCNC: 4.2 G/DL (ref 3.2–5)
ALP SERPL-CCNC: 98 U/L (ref 38–126)
ANION GAP SERPL CALCULATED.3IONS-SCNC: 14 MMOL/L
AST SERPL-CCNC: 21 U/L (ref 9–36)
BASOPHILS NFR BLD AUTO: 0 % (ref 0–3)
BUN SERPL-MCNC: 13 MG/DL (ref 8–23)
BUN/CREAT SERPL: 19
CHLORIDE SERPL-SCNC: 100 MMOL/L (ref 95–108)
CO2 SERPL-SCNC: 22 MMOL/L (ref 22–30)
CREAT SERPL-MCNC: 0.7 MG/DL (ref 0.5–1)
EOSINOPHIL NFR BLD AUTO: 0 % (ref 0–8)
ERYTHROCYTE [DISTWIDTH] IN BLOOD BY AUTOMATED COUNT: 13.2 % (ref 11.5–15.5)
HCT VFR BLD AUTO: 34.6 % (ref 37–47)
HGB BLD-MCNC: 12.4 G/DL (ref 12–16)
IMM GRANULOCYTES NFR BLD: 1.9 % (ref 0–5)
LYMPHOCYTES NFR BLD: 27.8 % (ref 15–41)
MCH RBC QN AUTO: 33.4 PG  CALC (ref 26–32)
MCHC RBC AUTO-ENTMCNC: 35.8 G/DL CAL (ref 32–36)
MCV RBC AUTO: 93.3 FL  CALC (ref 80–100)
MONOCYTES NFR BLD AUTO: 10 % (ref 2–13)
NEUTROPHILS # BLD AUTO: 4.42 THOU/UL (ref 2–7.15)
NEUTROPHILS NFR BLD AUTO: 60.3 % (ref 42–76)
PLATELET # BLD AUTO: 628 THOU/UL (ref 130–400)
POTASSIUM SERPL-SCNC: 4 MMOL/L (ref 3.5–5.1)
PROT SERPL-MCNC: 7.1 G/DL (ref 6.3–8.2)
RBC # BLD AUTO: 3.71 MILL/UL (ref 4.2–5.6)
SODIUM SERPL-SCNC: 132 MMOL/L (ref 137–146)

## 2022-12-21 NOTE — NUR
PATIENT SITTING UP IN BED WITH O2 VBIA NASAL CANNULA IN PLACE AT 2LPM. O2 SAT
IS 92% AT THBIS TIME. TELE MONITOR IN PLACE-[id number]. EYES ARE CLOSED. RESPS ARE
EVEN AND UNLABORED. CALL LIGHT IN REACH. WILL CONT TO MONITOR.

## 2022-12-21 NOTE — NUR
PATIENT ADMITTED FROM ER VIA WHEELCHAIR WITH NSG SUP IN ATTENDANCE. PATIENT
ABLE TO TRANSFER TO THE BED. O2 SAT UPON ADMISSION TO THE FLOOR WAS 80'S ON
ROOM AIR. O2 VIA NASAL CANNULA APPLIED AT 2LPM AND O2 SAT IMPROVED TO LOW TO
MID 90'S. AWAKE ALERT AND ORIENTEDX3., PATIENT WITH SOME ANXIETY AND ADMITS TO
HX OF HIGH ANXIETY AND COPD. CONT TO SMOKE 1/2 PACK OF CIGS/DAILY. MEDICATED
IN ER WITH VALIUM FOR ANXIETY. ALSO RECIEVED ANTIBIOTICS AND STEROIDS. LUNGS
ARE COARSE THROUOUT-PRODUCTIVE COUGH WITH CLEAR SECREATIONS. ABD IS SOFT WITH
ACTIVE BS. LAST BM WAS LAST SUNDAY 12/18. DENIES ANY DIFFICULTY WITH U
RINATION. NO PERIPHERAL EDEMA NOTED. FULSES ARE PALPABLE. INSTRUCTED ON USE OF
NURSE CALL LIGHT SYSTEM. PROVIDED WITH PO FLUIDS. SAFETY PRECAUTIONS
REINFORCED. CALL LIGHT IN REACH. WILL CONT TO MONITOR.

## 2022-12-21 NOTE — NUR
PT NOTED RESTING IN BED. NO APPARENT DISTRESS NOTED. RESPIRATIONS EVEN AND
UNLABORED. CONTINUES ON 2L/M VIA NC AND PULSE OX MONITOR. CALL LIGHT WITHIN
REACH. WILL CONTINUE TO MONITOR.

## 2022-12-21 NOTE — NUR
RECIEVED REPORT. PT A/OX3. RESPIRATIONS EVEN AND UNLABORED ON 2L NC. CRACKLES
AND WHEEZING NOTED UPON ASCULATATION OF LUNGS. HEART RHYTHM NORMAL WITH
TELE IN PLACE. BOWEL SOUNDS ACTIVE. #22G RH PATENT. SKIN INTACT. PT DENIES OF
ANY PAINS OR DISCOMFORTS AT THIS TIME. ALL SAFTEY PRECAUTIONS ARE IN PLACE
WITH CALL LIGHT IN REACH. PT ENCOURAGED TO CALL FOR ASSISTANCE WHEN NEEDED.

## 2022-12-21 NOTE — NUR
REPORT GIVEN TO JOSSE HARRIS. PATIENT TRANSPORTED TO FLOOR BY JOSSE ACUÑA WITH TELE
BOX 9 AND CONTINUOUS PULSE OX.

## 2022-12-21 NOTE — NUR
PATIENT SITTING UP IN BED AT THIS TIME WITH O2 VIA NASAL CANNULA INPLACE AT
2LPM. PATIENT ASKING FOR MORE "anxiety meds". DPATIENT WAS MEDICATED IN ER
WITH VALIUM 10MG PO AFTER MN. PATIENT WAS INSTRUCTED THAT THE MD WOULD BE IN
THIS MORNING TO EVAL HER AND WILL ORDER HER DAILY MEDS AT THAT TIME.
VERBALIZES UNDERSTANDING. SALINE LOCK TO LAC INTACT. TELE MONITOR IN
PLACE-LAST READING WAS  [id number]. CALL LIGHT IN REACH. WILL CONT TO MONITOR.

## 2022-12-22 VITALS — SYSTOLIC BLOOD PRESSURE: 113 MMHG | DIASTOLIC BLOOD PRESSURE: 49 MMHG

## 2022-12-22 VITALS — SYSTOLIC BLOOD PRESSURE: 100 MMHG | DIASTOLIC BLOOD PRESSURE: 59 MMHG

## 2022-12-22 VITALS — DIASTOLIC BLOOD PRESSURE: 57 MMHG | SYSTOLIC BLOOD PRESSURE: 129 MMHG

## 2022-12-22 VITALS — SYSTOLIC BLOOD PRESSURE: 96 MMHG | DIASTOLIC BLOOD PRESSURE: 42 MMHG

## 2022-12-22 VITALS — SYSTOLIC BLOOD PRESSURE: 127 MMHG | DIASTOLIC BLOOD PRESSURE: 60 MMHG

## 2022-12-22 VITALS — SYSTOLIC BLOOD PRESSURE: 114 MMHG | DIASTOLIC BLOOD PRESSURE: 67 MMHG

## 2022-12-22 LAB
ANION GAP SERPL CALCULATED.3IONS-SCNC: 14 MMOL/L
BUN SERPL-MCNC: 23 MG/DL (ref 8–23)
BUN/CREAT SERPL: 32
CHLORIDE SERPL-SCNC: 103 MMOL/L (ref 95–108)
CO2 SERPL-SCNC: 24 MMOL/L (ref 22–30)
CREAT SERPL-MCNC: 0.7 MG/DL (ref 0.5–1)
ERYTHROCYTE [DISTWIDTH] IN BLOOD BY AUTOMATED COUNT: 14 % (ref 11.5–15.5)
HCT VFR BLD AUTO: 34.1 % (ref 37–47)
HGB BLD-MCNC: 12 G/DL (ref 12–16)
MAGNESIUM SERPL-MCNC: 2.4 MG/DL (ref 1.6–2.3)
MCH RBC QN AUTO: 34.1 PG  CALC (ref 26–32)
MCHC RBC AUTO-ENTMCNC: 35.2 G/DL CAL (ref 32–36)
MCV RBC AUTO: 96.9 FL  CALC (ref 80–100)
PLATELET # BLD AUTO: 600 THOU/UL (ref 130–400)
POTASSIUM SERPL-SCNC: 3.9 MMOL/L (ref 3.5–5.1)
RBC # BLD AUTO: 3.52 MILL/UL (ref 4.2–5.6)
SODIUM SERPL-SCNC: 137 MMOL/L (ref 137–146)

## 2022-12-22 PROCEDURE — 3E0234Z INTRODUCTION OF SERUM, TOXOID AND VACCINE INTO MUSCLE, PERCUTANEOUS APPROACH: ICD-10-PCS | Performed by: INTERNAL MEDICINE

## 2022-12-22 NOTE — NUR
PT SLEEPING IN SEMI FOWLERS POSITION. RESPIRATIONS EVEN AND UNLABORED ON 2L
NC. TELE MONITORING IN PLACE. IV SITE NOTED. NO SIGNS OF ANY DISTRESS. ALL
SAFTEY PRECAUTIONS ARE IN PLACE WITH CALL LIGHT IN REACH

## 2022-12-22 NOTE — NUR
PATIENT SITTING UP IN BED. COMPLAINTS OF PAIN TO HEADACHE AND PAIN TO PREVIOUS
IV SITE ON RIGHT WRIST. SITE WAS WRAPPED, ELEVATED ON PILLOW AND ICE PACK
PROVIDED BY DAY SHIFT NURSE. ASSESSMENT COMPLETE. PRN MEDICATION GIVEN FOR
HEADACHE. OBSERVED SITE TO WRIST, SLIGHTLY PINK. SUPERVISOR TO COME TO ROOM TO
VIEW WRIST AS WELL. BED REMAINS IN LOW POSITION, CALL LIGHT IN REACH.

## 2022-12-22 NOTE — NUR
REPORT FROM CRISTY ANTON. PT NOTED SITTING UP IN BED. NO APPARENT DISTRESS
NOTED. PT REMAINS ON 2L/M VIA NC. TELEMETRY MONITOR IN PLACE. IV SITE APPEARS
HEALTHY. CALL LIGHT WITHIN REACH. WILL CONTINUE TO MONITOR.

## 2022-12-22 NOTE — NUR
PT SLEEPING IN SEMI FOWLERS POSITION. RESPIRATIONS EVEN AND UNLABORED ON 2L
NC . TELE MONITORING IN PLACE. #22G RH NOTED. NO S/S OF DISTRESS. ALL SAFTEY
PRECAUTIONS ARE IN PLACE WITH CALL LIGHT IN REACH

## 2022-12-22 NOTE — NUR
WRITER CALLED TO ROOM FOR DISCOMFORT TO IV SITE. IV SITE APPEARS INFILTRATED
SMALL POCKET OF FLUIDS UNDER SKIN NOTED. NO APPARENT REDNESS NOTED AT THIS
TIME. IV ABT INFUSION STOPPED AT THIS TIME AND IV SITE DISCONTINUED. COBAN
APPLIED, ICE PACK PROVIDED AND ARM ELEVATED ON PILLOW FOR COMFORT. NEW IV
STARTED X1 ATTEMPT, PT TOLERATED WELL. IV ABT RESTARTED AT THIS TIME. CALL
LIGHT WITHIN REACH. WILL CONTINUE TO MONITOR.

## 2022-12-22 NOTE — NUR
PATIENT RESTING IN BED. COMPLAINING OF HEADACHE. PRN PAIN MEDICATION GIVEN AS
INDICATED. REMINDED PATIENT TO TAKE SLOW DEEP BREATHS. NO MORE COMPLAINTS OF
LEFT LOWER ARM AREA HURTING. PICTURE WAS TAKEN FOR PATIENTS CHART TO MONITOR.
BED REMAINS IN LOW POSITION. CALL BELL IN REACH.

## 2022-12-22 NOTE — NUR
DISCUSSED WITH PT AT HOME OXYGEN AND EDUCATED ON MONITORING OXYGEN SATURATION.
PT REQUEST IF INSURANCE PAID FOR AT HOME PULSE OX. SPOKE WITH CASE MANAGEMENT
AT THIS TIME REGARDING REQUEST, PER CASE MANAGEMENT INSURANCE SHOULD PROVIDED
PULSE OX IF ORDERED WITH AT HOME OXYGEN. DISCUSSED WITH PT AND VISITOR. ALSO
DISCUSSED SAFETY PRECAUTIONS SUCH AS NOT SMOKING WHILE WEARING OXYGEN. PT
VERBALIZED UNDERSTANDING.

## 2022-12-22 NOTE — NUR
PT RESTING IN BED AT THIS TIME. NO APPARENT DISTRESS NOTED. O2 @ 2L/M VIA NC.
CALL LIGHT WITHIN REACH. WILL CONTINUE TO MONITOR.

## 2022-12-23 VITALS — DIASTOLIC BLOOD PRESSURE: 44 MMHG | SYSTOLIC BLOOD PRESSURE: 108 MMHG

## 2022-12-23 VITALS — DIASTOLIC BLOOD PRESSURE: 44 MMHG | SYSTOLIC BLOOD PRESSURE: 114 MMHG

## 2022-12-23 VITALS — DIASTOLIC BLOOD PRESSURE: 56 MMHG | SYSTOLIC BLOOD PRESSURE: 131 MMHG

## 2022-12-23 VITALS — SYSTOLIC BLOOD PRESSURE: 124 MMHG | DIASTOLIC BLOOD PRESSURE: 58 MMHG

## 2022-12-23 LAB
ANION GAP SERPL CALCULATED.3IONS-SCNC: 13 MMOL/L
BUN SERPL-MCNC: 19 MG/DL (ref 8–23)
BUN/CREAT SERPL: 27
CHLORIDE SERPL-SCNC: 102 MMOL/L (ref 95–108)
CO2 SERPL-SCNC: 26 MMOL/L (ref 22–30)
CREAT SERPL-MCNC: 0.7 MG/DL (ref 0.5–1)
ERYTHROCYTE [DISTWIDTH] IN BLOOD BY AUTOMATED COUNT: 14.3 % (ref 11.5–15.5)
HCT VFR BLD AUTO: 39.7 % (ref 37–47)
HGB BLD-MCNC: 13.3 G/DL (ref 12–16)
MAGNESIUM SERPL-MCNC: 2.5 MG/DL (ref 1.6–2.3)
MCH RBC QN AUTO: 33.3 PG  CALC (ref 26–32)
MCHC RBC AUTO-ENTMCNC: 33.5 G/DL CAL (ref 32–36)
MCV RBC AUTO: 99.5 FL  CALC (ref 80–100)
PLATELET # BLD AUTO: 671 THOU/UL (ref 130–400)
POTASSIUM SERPL-SCNC: 4.8 MMOL/L (ref 3.5–5.1)
RBC # BLD AUTO: 3.99 MILL/UL (ref 4.2–5.6)
SODIUM SERPL-SCNC: 136 MMOL/L (ref 137–146)

## 2022-12-23 NOTE — NUR
PT RESTING IN BED. STATES NO PAIN. BREATHING EVEN AND UNLABORED. NO DISTRESS
NOTED. FALL/SAFTEY PRECAUTION IN PLACE. CALL LIGHT WITHIN REACH

## 2022-12-23 NOTE — NUR
PATIENT ALERT AND ABLE TO MAKE NEEDS KNOWN. SITTING UP IN BED COMPLAINTS OF A
HEADACHE. PRN PAIN MEDICATION GIVEN PER REQUEST. PATIENT TOLERATED WELL. FRESH
GINGER ALE GIVEN. BED REMAINS IN LOW POSITION. CALL BELL IN REACH.

## 2022-12-23 NOTE — NUR
PT RESTING IN BED WATCHIGN TV. ASSESSMENT COMPLETE. TELE IN PLACE, COTNINOUS
MONTIORING PER ED.  TEELE BOX#9\
 
STATES NO PAIN. UPDATED T IN CURRENT PLAN OF CARE. PT TINDICATED
UNDERSTANDING. FALL/SAFTEY PRECAUTION IN PLACE. CALL LIGHT WITHIN REACH

## 2023-01-07 ENCOUNTER — HOSPITAL ENCOUNTER (EMERGENCY)
Dept: HOSPITAL 82 - ED | Age: 63
Discharge: HOME | End: 2023-01-07
Payer: MEDICAID

## 2023-01-07 VITALS — DIASTOLIC BLOOD PRESSURE: 73 MMHG | SYSTOLIC BLOOD PRESSURE: 128 MMHG

## 2023-01-07 VITALS — WEIGHT: 152.12 LBS | HEIGHT: 66 IN | BODY MASS INDEX: 24.45 KG/M2

## 2023-01-07 DIAGNOSIS — E11.9: ICD-10-CM

## 2023-01-07 DIAGNOSIS — J44.9: ICD-10-CM

## 2023-01-07 DIAGNOSIS — I10: ICD-10-CM
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
## 2023-01-07 DIAGNOSIS — F17.200: ICD-10-CM
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
## 2023-01-07 DIAGNOSIS — F41.1: ICD-10-CM
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
## 2023-01-07 DIAGNOSIS — S20.212A: Primary | ICD-10-CM

## 2023-03-10 ENCOUNTER — HOSPITAL ENCOUNTER (EMERGENCY)
Dept: HOSPITAL 82 - ED | Age: 63
Discharge: HOME | End: 2023-03-10
Payer: MEDICAID

## 2023-03-10 VITALS — SYSTOLIC BLOOD PRESSURE: 114 MMHG | DIASTOLIC BLOOD PRESSURE: 61 MMHG

## 2023-03-10 VITALS — WEIGHT: 144.84 LBS | HEIGHT: 66 IN | BODY MASS INDEX: 23.28 KG/M2

## 2023-03-10 VITALS — DIASTOLIC BLOOD PRESSURE: 65 MMHG | SYSTOLIC BLOOD PRESSURE: 114 MMHG

## 2023-03-10 VITALS — DIASTOLIC BLOOD PRESSURE: 68 MMHG | SYSTOLIC BLOOD PRESSURE: 103 MMHG

## 2023-03-10 VITALS — SYSTOLIC BLOOD PRESSURE: 111 MMHG | DIASTOLIC BLOOD PRESSURE: 56 MMHG

## 2023-03-10 VITALS — SYSTOLIC BLOOD PRESSURE: 119 MMHG | DIASTOLIC BLOOD PRESSURE: 64 MMHG

## 2023-03-10 VITALS — SYSTOLIC BLOOD PRESSURE: 112 MMHG | DIASTOLIC BLOOD PRESSURE: 66 MMHG

## 2023-03-10 VITALS — DIASTOLIC BLOOD PRESSURE: 52 MMHG | SYSTOLIC BLOOD PRESSURE: 105 MMHG

## 2023-03-10 VITALS — SYSTOLIC BLOOD PRESSURE: 113 MMHG | DIASTOLIC BLOOD PRESSURE: 57 MMHG

## 2023-03-10 DIAGNOSIS — J44.9: ICD-10-CM

## 2023-03-10 DIAGNOSIS — F41.1: ICD-10-CM

## 2023-03-10 DIAGNOSIS — I10: ICD-10-CM

## 2023-03-10 DIAGNOSIS — R19.7: Primary | ICD-10-CM

## 2023-03-10 DIAGNOSIS — E11.9: ICD-10-CM

## 2023-03-10 DIAGNOSIS — F17.200: ICD-10-CM

## 2023-03-11 ENCOUNTER — HOSPITAL ENCOUNTER (EMERGENCY)
Dept: HOSPITAL 82 - ED | Age: 63
Discharge: HOME | End: 2023-03-11
Payer: MEDICAID

## 2023-03-11 VITALS — HEIGHT: 66 IN | BODY MASS INDEX: 23.03 KG/M2 | WEIGHT: 143.3 LBS

## 2023-03-11 VITALS — DIASTOLIC BLOOD PRESSURE: 77 MMHG | SYSTOLIC BLOOD PRESSURE: 139 MMHG

## 2023-03-11 VITALS — SYSTOLIC BLOOD PRESSURE: 139 MMHG | DIASTOLIC BLOOD PRESSURE: 77 MMHG

## 2023-03-11 DIAGNOSIS — I10: ICD-10-CM

## 2023-03-11 DIAGNOSIS — M79.7: ICD-10-CM

## 2023-03-11 DIAGNOSIS — L08.9: ICD-10-CM

## 2023-03-11 DIAGNOSIS — J44.9: ICD-10-CM

## 2023-03-11 DIAGNOSIS — E11.9: ICD-10-CM

## 2023-03-11 DIAGNOSIS — F19.10: Primary | ICD-10-CM

## 2023-03-11 DIAGNOSIS — F41.1: ICD-10-CM

## 2023-03-11 LAB
ALBUMIN SERPL-MCNC: 4.8 G/DL (ref 3.2–5)
ALP SERPL-CCNC: 92 U/L (ref 38–126)
ANION GAP SERPL CALCULATED.3IONS-SCNC: 13 MMOL/L
AST SERPL-CCNC: 25 U/L (ref 9–36)
BASOPHILS NFR BLD AUTO: 1.1 % (ref 0–3)
BILIRUB UR QL STRIP.AUTO: NEGATIVE
BUN SERPL-MCNC: 11 MG/DL (ref 8–23)
BUN/CREAT SERPL: 13
CHLORIDE SERPL-SCNC: 100 MMOL/L (ref 95–108)
CO2 SERPL-SCNC: 22 MMOL/L (ref 22–30)
COLOR UR AUTO: YELLOW
CREAT SERPL-MCNC: 0.9 MG/DL (ref 0.5–1)
EOSINOPHIL NFR BLD AUTO: 3 % (ref 0–8)
ERYTHROCYTE [DISTWIDTH] IN BLOOD BY AUTOMATED COUNT: 13.4 % (ref 11.5–15.5)
GLUCOSE UR STRIP.AUTO-MCNC: NEGATIVE MG/DL
HCT VFR BLD AUTO: 38.9 % (ref 37–47)
HGB BLD-MCNC: 12.6 G/DL (ref 12–16)
HGB UR QL STRIP.AUTO: NEGATIVE
IMM GRANULOCYTES NFR BLD: 0.4 % (ref 0–5)
KETONES UR STRIP.AUTO-MCNC: NEGATIVE MG/DL
LEUKOCYTE ESTERASE UR QL STRIP.AUTO: NEGATIVE
LYMPHOCYTES NFR BLD: 45.8 % (ref 15–41)
MCH RBC QN AUTO: 31.8 PG  CALC (ref 26–32)
MCHC RBC AUTO-ENTMCNC: 32.4 G/DL CAL (ref 32–36)
MCV RBC AUTO: 98.2 FL  CALC (ref 80–100)
MONOCYTES NFR BLD AUTO: 9 % (ref 2–13)
NEUTROPHILS # BLD AUTO: 4.42 THOU/UL (ref 2–7.15)
NEUTROPHILS NFR BLD AUTO: 40.7 % (ref 42–76)
NITRITE UR QL STRIP.AUTO: NEGATIVE
PH UR STRIP.AUTO: 6 [PH] (ref 4.5–8)
PLATELET # BLD AUTO: 458 THOU/UL (ref 130–400)
POTASSIUM SERPL-SCNC: 4 MMOL/L (ref 3.5–5.1)
PROT SERPL-MCNC: 7.9 G/DL (ref 6.3–8.2)
PROT UR QL STRIP.AUTO: NEGATIVE MG/DL
RBC # BLD AUTO: 3.96 MILL/UL (ref 4.2–5.6)
SODIUM SERPL-SCNC: 131 MMOL/L (ref 137–146)
SP GR UR STRIP.AUTO: <=1.005
UROBILINOGEN UR QL STRIP.AUTO: 0.2 E.U./DL

## 2023-05-23 ENCOUNTER — HOSPITAL ENCOUNTER (OUTPATIENT)
Dept: HOSPITAL 82 - ENDO | Age: 63
Discharge: HOME | End: 2023-05-23
Attending: SURGERY
Payer: MEDICAID

## 2023-05-23 VITALS — DIASTOLIC BLOOD PRESSURE: 69 MMHG | SYSTOLIC BLOOD PRESSURE: 126 MMHG

## 2023-05-23 VITALS — BODY MASS INDEX: 22.5 KG/M2 | WEIGHT: 140 LBS | HEIGHT: 66 IN

## 2023-05-23 DIAGNOSIS — K29.70: ICD-10-CM

## 2023-05-23 DIAGNOSIS — D12.5: ICD-10-CM

## 2023-05-23 DIAGNOSIS — K64.8: ICD-10-CM

## 2023-05-23 DIAGNOSIS — I10: ICD-10-CM

## 2023-05-23 DIAGNOSIS — K57.30: ICD-10-CM

## 2023-05-23 DIAGNOSIS — Z12.11: Primary | ICD-10-CM

## 2023-05-23 DIAGNOSIS — K62.1: ICD-10-CM

## 2023-05-23 DIAGNOSIS — K64.4: ICD-10-CM

## 2023-09-10 ENCOUNTER — HOSPITAL ENCOUNTER (OUTPATIENT)
Dept: HOSPITAL 82 - ED | Age: 63
Setting detail: OBSERVATION
LOS: 1 days | Discharge: HOME | End: 2023-09-11
Attending: STUDENT IN AN ORGANIZED HEALTH CARE EDUCATION/TRAINING PROGRAM | Admitting: STUDENT IN AN ORGANIZED HEALTH CARE EDUCATION/TRAINING PROGRAM
Payer: MEDICAID

## 2023-09-10 VITALS — SYSTOLIC BLOOD PRESSURE: 153 MMHG | DIASTOLIC BLOOD PRESSURE: 86 MMHG

## 2023-09-10 VITALS — DIASTOLIC BLOOD PRESSURE: 72 MMHG | SYSTOLIC BLOOD PRESSURE: 141 MMHG

## 2023-09-10 VITALS — SYSTOLIC BLOOD PRESSURE: 155 MMHG | DIASTOLIC BLOOD PRESSURE: 83 MMHG

## 2023-09-10 VITALS — SYSTOLIC BLOOD PRESSURE: 151 MMHG | DIASTOLIC BLOOD PRESSURE: 84 MMHG

## 2023-09-10 VITALS — DIASTOLIC BLOOD PRESSURE: 75 MMHG | SYSTOLIC BLOOD PRESSURE: 135 MMHG

## 2023-09-10 VITALS — BODY MASS INDEX: 22.66 KG/M2 | HEIGHT: 66 IN | WEIGHT: 140.99 LBS

## 2023-09-10 VITALS — DIASTOLIC BLOOD PRESSURE: 84 MMHG | SYSTOLIC BLOOD PRESSURE: 156 MMHG

## 2023-09-10 VITALS — DIASTOLIC BLOOD PRESSURE: 78 MMHG | SYSTOLIC BLOOD PRESSURE: 159 MMHG

## 2023-09-10 VITALS — DIASTOLIC BLOOD PRESSURE: 90 MMHG | SYSTOLIC BLOOD PRESSURE: 156 MMHG

## 2023-09-10 VITALS — DIASTOLIC BLOOD PRESSURE: 75 MMHG | SYSTOLIC BLOOD PRESSURE: 161 MMHG

## 2023-09-10 VITALS — DIASTOLIC BLOOD PRESSURE: 77 MMHG | SYSTOLIC BLOOD PRESSURE: 153 MMHG

## 2023-09-10 VITALS — SYSTOLIC BLOOD PRESSURE: 165 MMHG | DIASTOLIC BLOOD PRESSURE: 88 MMHG

## 2023-09-10 VITALS — SYSTOLIC BLOOD PRESSURE: 142 MMHG | DIASTOLIC BLOOD PRESSURE: 94 MMHG

## 2023-09-10 VITALS — DIASTOLIC BLOOD PRESSURE: 63 MMHG | SYSTOLIC BLOOD PRESSURE: 148 MMHG

## 2023-09-10 VITALS — DIASTOLIC BLOOD PRESSURE: 81 MMHG | SYSTOLIC BLOOD PRESSURE: 156 MMHG

## 2023-09-10 VITALS — SYSTOLIC BLOOD PRESSURE: 141 MMHG | DIASTOLIC BLOOD PRESSURE: 85 MMHG

## 2023-09-10 DIAGNOSIS — I10: ICD-10-CM

## 2023-09-10 DIAGNOSIS — F90.9: ICD-10-CM

## 2023-09-10 DIAGNOSIS — E87.6: ICD-10-CM

## 2023-09-10 DIAGNOSIS — E87.1: Primary | ICD-10-CM

## 2023-09-10 DIAGNOSIS — F41.1: ICD-10-CM

## 2023-09-10 DIAGNOSIS — E11.9: ICD-10-CM

## 2023-09-10 DIAGNOSIS — J44.9: ICD-10-CM

## 2023-09-10 DIAGNOSIS — M79.7: ICD-10-CM

## 2023-09-10 DIAGNOSIS — Z73.3: ICD-10-CM

## 2023-09-10 LAB
ALBUMIN SERPL-MCNC: 5.3 G/DL (ref 3.2–5)
ALP SERPL-CCNC: 124 U/L (ref 38–126)
ANION GAP SERPL CALCULATED.3IONS-SCNC: 10 MMOL/L
ANION GAP SERPL CALCULATED.3IONS-SCNC: 18 MMOL/L
AST SERPL-CCNC: 35 U/L (ref 9–36)
BASOPHILS NFR BLD AUTO: 0.7 % (ref 0–3)
BUN SERPL-MCNC: 8 MG/DL (ref 8–23)
BUN SERPL-MCNC: 9 MG/DL (ref 8–23)
BUN/CREAT SERPL: 11
BUN/CREAT SERPL: 15
CHLORIDE SERPL-SCNC: 82 MMOL/L (ref 95–108)
CHLORIDE SERPL-SCNC: 97 MMOL/L (ref 95–108)
CO2 SERPL-SCNC: 23 MMOL/L (ref 22–30)
CO2 SERPL-SCNC: 24 MMOL/L (ref 22–30)
CREAT SERPL-MCNC: 0.6 MG/DL (ref 0.5–1)
CREAT SERPL-MCNC: 0.7 MG/DL (ref 0.5–1)
EOSINOPHIL NFR BLD AUTO: 0.3 % (ref 0–8)
ERYTHROCYTE [DISTWIDTH] IN BLOOD BY AUTOMATED COUNT: 13.3 % (ref 11.5–15.5)
HCT VFR BLD AUTO: 37.6 % (ref 37–47)
HGB BLD-MCNC: 13.3 G/DL (ref 12–16)
IMM GRANULOCYTES NFR BLD: 0.4 % (ref 0–5)
LYMPHOCYTES NFR BLD: 27.4 % (ref 15–41)
MCH RBC QN AUTO: 32.6 PG  CALC (ref 26–32)
MCHC RBC AUTO-ENTMCNC: 35.4 G/DL CAL (ref 32–36)
MCV RBC AUTO: 92.2 FL  CALC (ref 80–100)
MONOCYTES NFR BLD AUTO: 9.3 % (ref 2–13)
NEUTROPHILS # BLD AUTO: 6.63 THOU/UL (ref 2–7.15)
NEUTROPHILS NFR BLD AUTO: 61.9 % (ref 42–76)
PLATELET # BLD AUTO: 567 THOU/UL (ref 130–400)
POTASSIUM SERPL-SCNC: 3.1 MMOL/L (ref 3.5–5.1)
POTASSIUM SERPL-SCNC: 3.3 MMOL/L (ref 3.5–5.1)
PROT SERPL-MCNC: 8.6 G/DL (ref 6.3–8.2)
RBC # BLD AUTO: 4.08 MILL/UL (ref 4.2–5.6)
SODIUM SERPL-SCNC: 120 MMOL/L (ref 137–146)
SODIUM SERPL-SCNC: 128 MMOL/L (ref 137–146)

## 2023-09-10 PROCEDURE — G0378 HOSPITAL OBSERVATION PER HR: HCPCS

## 2023-09-11 VITALS — SYSTOLIC BLOOD PRESSURE: 139 MMHG | DIASTOLIC BLOOD PRESSURE: 69 MMHG

## 2023-09-11 VITALS — DIASTOLIC BLOOD PRESSURE: 72 MMHG | SYSTOLIC BLOOD PRESSURE: 156 MMHG

## 2023-09-11 VITALS — SYSTOLIC BLOOD PRESSURE: 140 MMHG | DIASTOLIC BLOOD PRESSURE: 68 MMHG

## 2023-09-11 LAB
ANION GAP SERPL CALCULATED.3IONS-SCNC: 12 MMOL/L
BUN SERPL-MCNC: 6 MG/DL (ref 8–23)
BUN/CREAT SERPL: 9
CHLORIDE SERPL-SCNC: 101 MMOL/L (ref 95–108)
CHOLEST SERPL-MCNC: 236 MG/DL (ref 0–199)
CHOLEST/HDLC SERPL: 4.7 {RATIO}
CO2 SERPL-SCNC: 25 MMOL/L (ref 22–30)
CREAT SERPL-MCNC: 0.7 MG/DL (ref 0.5–1)
ERYTHROCYTE [DISTWIDTH] IN BLOOD BY AUTOMATED COUNT: 13.6 % (ref 11.5–15.5)
HCT VFR BLD AUTO: 35.2 % (ref 37–47)
HDLC SERPL-MCNC: 50 MG/DL (ref 39–59)
HGB BLD-MCNC: 12.5 G/DL (ref 12–16)
KETONES UR STRIP.AUTO-MCNC: 15 MG/DL
LDLC SERPL CALC-MCNC: 151 MG/DL
MAGNESIUM SERPL-MCNC: 2.2 MG/DL (ref 1.6–2.3)
MCH RBC QN AUTO: 33.2 PG  CALC (ref 26–32)
MCHC RBC AUTO-ENTMCNC: 35.5 G/DL CAL (ref 32–36)
MCV RBC AUTO: 93.6 FL  CALC (ref 80–100)
PH UR STRIP.AUTO: 6 [PH] (ref 4.5–8)
PLATELET # BLD AUTO: 528 THOU/UL (ref 130–400)
POTASSIUM SERPL-SCNC: 3.5 MMOL/L (ref 3.5–5.1)
RBC # BLD AUTO: 3.76 MILL/UL (ref 4.2–5.6)
SODIUM SERPL-SCNC: 134 MMOL/L (ref 137–146)
SP GR UR STRIP.AUTO: 1.01
TRIGL SERPL-MCNC: 175 MG/DL (ref 0–149)
UROBILINOGEN UR QL STRIP.AUTO: 0.2 E.U./DL
VLDLC SERPL CALC-MCNC: 35 MG/DL